# Patient Record
Sex: FEMALE | Race: OTHER | NOT HISPANIC OR LATINO | URBAN - METROPOLITAN AREA
[De-identification: names, ages, dates, MRNs, and addresses within clinical notes are randomized per-mention and may not be internally consistent; named-entity substitution may affect disease eponyms.]

---

## 2022-07-09 ENCOUNTER — TELEPHONE ENCOUNTER (OUTPATIENT)
Dept: URBAN - METROPOLITAN AREA CLINIC 121 | Facility: CLINIC | Age: 47
End: 2022-07-09

## 2022-07-09 RX ORDER — OMEPRAZOLE 40 MG/1
CAPSULE, DELAYED RELEASE ORAL TWICE A DAY
Refills: 0 | OUTPATIENT
Start: 2015-12-01 | End: 2016-01-05

## 2022-07-09 RX ORDER — SUCRALFATE 1 G/1
FOUR TIMES A DAY TABLET ORAL
Refills: 2 | OUTPATIENT
Start: 2015-12-01 | End: 2016-01-05

## 2022-07-09 RX ORDER — OMEPRAZOLE 40 MG/1
ONCE A DAY CAPSULE, DELAYED RELEASE ORAL ONCE A DAY
Refills: 1 | OUTPATIENT
Start: 2015-09-24 | End: 2015-11-10

## 2022-07-09 RX ORDER — OMEPRAZOLE 40 MG/1
ONCE A DAY CAPSULE, DELAYED RELEASE ORAL ONCE A DAY
Refills: 1 | OUTPATIENT
Start: 2015-09-24 | End: 2015-09-24

## 2022-07-09 RX ORDER — CLARITHROMYCIN 500 MG/1
ONCE A DAY TABLET ORAL ONCE A DAY
Refills: 0 | OUTPATIENT
Start: 2015-11-10 | End: 2015-12-01

## 2022-07-09 RX ORDER — OMEPRAZOLE 40 MG/1
CAPSULE, DELAYED RELEASE ORAL ONCE A DAY
Refills: 0 | OUTPATIENT
Start: 2015-12-01 | End: 2015-12-01

## 2022-07-09 RX ORDER — OMEPRAZOLE 40 MG/1
TWICE A DAY CAPSULE, DELAYED RELEASE ORAL TWICE A DAY
Refills: 1 | OUTPATIENT
Start: 2015-11-10 | End: 2015-12-01

## 2022-07-09 RX ORDER — AMOXICILLIN 500 MG/1
TAKE 2 CAPS TWICE AT DAY CAPSULE ORAL TWICE A DAY
Refills: 0 | OUTPATIENT
Start: 2015-11-10 | End: 2015-12-01

## 2022-07-10 ENCOUNTER — TELEPHONE ENCOUNTER (OUTPATIENT)
Dept: URBAN - METROPOLITAN AREA CLINIC 121 | Facility: CLINIC | Age: 47
End: 2022-07-10

## 2022-07-10 RX ORDER — SUCRALFATE 1 G/1
FOUR TIMES A DAY TABLET ORAL
Refills: 2 | Status: ACTIVE | COMMUNITY
Start: 2016-01-05

## 2022-07-10 RX ORDER — OMEPRAZOLE 40 MG/1
ONCE A DAY CAPSULE, DELAYED RELEASE ORAL ONCE A DAY
Refills: 3 | Status: ACTIVE | COMMUNITY
Start: 2016-01-05

## 2024-08-28 ENCOUNTER — DASHBOARD ENCOUNTERS (OUTPATIENT)
Age: 49
End: 2024-08-28

## 2024-08-28 ENCOUNTER — LAB OUTSIDE AN ENCOUNTER (OUTPATIENT)
Dept: URBAN - METROPOLITAN AREA CLINIC 68 | Facility: CLINIC | Age: 49
End: 2024-08-28

## 2024-08-28 ENCOUNTER — OFFICE VISIT (OUTPATIENT)
Dept: URBAN - METROPOLITAN AREA CLINIC 68 | Facility: CLINIC | Age: 49
End: 2024-08-28
Payer: COMMERCIAL

## 2024-08-28 VITALS
WEIGHT: 223 LBS | BODY MASS INDEX: 37.15 KG/M2 | HEIGHT: 65 IN | SYSTOLIC BLOOD PRESSURE: 123 MMHG | DIASTOLIC BLOOD PRESSURE: 72 MMHG

## 2024-08-28 DIAGNOSIS — Z86.010 PERSONAL HISTORY OF COLONIC POLYPS: ICD-10-CM

## 2024-08-28 DIAGNOSIS — R13.19 ESOPHAGEAL DYSPHAGIA: ICD-10-CM

## 2024-08-28 DIAGNOSIS — R12 HEARTBURN: ICD-10-CM

## 2024-08-28 PROBLEM — 16331000: Status: ACTIVE | Noted: 2024-08-28

## 2024-08-28 PROBLEM — 40890009: Status: ACTIVE | Noted: 2024-08-28

## 2024-08-28 PROCEDURE — 99204 OFFICE O/P NEW MOD 45 MIN: CPT | Performed by: INTERNAL MEDICINE

## 2024-08-28 RX ORDER — OMEPRAZOLE 40 MG/1
ONCE A DAY CAPSULE, DELAYED RELEASE ORAL ONCE A DAY
Refills: 3 | Status: ON HOLD | COMMUNITY
Start: 2016-01-05

## 2024-08-28 RX ORDER — SUCRALFATE 1 G/1
FOUR TIMES A DAY TABLET ORAL
Refills: 2 | Status: ON HOLD | COMMUNITY
Start: 2016-01-05

## 2024-08-28 NOTE — HPI-TODAY'S VISIT:
Patient evaluated due dysphagia. Referred having intermittent episodes of nausea and dysphagia with solids and pills. REferred intermittent episode of heartburn.  Denies nausea, vomits, dysphagia, odynophagia,  abdominal pain, diarrhea, constipation GI bleeding or weight loss

## 2024-09-04 ENCOUNTER — LAB OUTSIDE AN ENCOUNTER (OUTPATIENT)
Dept: URBAN - METROPOLITAN AREA CLINIC 68 | Facility: CLINIC | Age: 49
End: 2024-09-04

## 2024-09-09 ENCOUNTER — OFFICE VISIT (OUTPATIENT)
Dept: URBAN - METROPOLITAN AREA SURGERY CENTER 12 | Facility: SURGERY CENTER | Age: 49
End: 2024-09-09